# Patient Record
Sex: FEMALE | Race: WHITE | URBAN - METROPOLITAN AREA
[De-identification: names, ages, dates, MRNs, and addresses within clinical notes are randomized per-mention and may not be internally consistent; named-entity substitution may affect disease eponyms.]

---
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
## 2024-09-21 ENCOUNTER — APPOINTMENT (OUTPATIENT)
Dept: RADIOLOGY | Facility: HOSPITAL | Age: 24
End: 2024-09-21

## 2024-09-21 ENCOUNTER — HOSPITAL ENCOUNTER (EMERGENCY)
Facility: HOSPITAL | Age: 24
Discharge: HOME | End: 2024-09-22
Attending: EMERGENCY MEDICINE

## 2024-09-21 ENCOUNTER — APPOINTMENT (OUTPATIENT)
Dept: CARDIOLOGY | Facility: HOSPITAL | Age: 24
End: 2024-09-21

## 2024-09-21 DIAGNOSIS — R40.4 STARING EPISODES: Primary | ICD-10-CM

## 2024-09-21 LAB
ANION GAP BLDV CALCULATED.4IONS-SCNC: 12 MMOL/L (ref 10–25)
BASE EXCESS BLDV CALC-SCNC: 0.4 MMOL/L (ref -2–3)
BASOPHILS # BLD AUTO: 0.05 X10*3/UL (ref 0–0.1)
BASOPHILS NFR BLD AUTO: 0.4 %
BODY TEMPERATURE: 37 DEGREES CELSIUS
CA-I BLDV-SCNC: 1.24 MMOL/L (ref 1.1–1.33)
CHLORIDE BLDV-SCNC: 102 MMOL/L (ref 98–107)
EOSINOPHIL # BLD AUTO: 0.28 X10*3/UL (ref 0–0.7)
EOSINOPHIL NFR BLD AUTO: 2.1 %
ERYTHROCYTE [DISTWIDTH] IN BLOOD BY AUTOMATED COUNT: 13.1 % (ref 11.5–14.5)
GLUCOSE BLDV-MCNC: 101 MG/DL (ref 74–99)
HCO3 BLDV-SCNC: 27 MMOL/L (ref 22–26)
HCT VFR BLD AUTO: 43.5 % (ref 36–46)
HCT VFR BLD EST: 44 % (ref 36–46)
HGB BLD-MCNC: 14.2 G/DL (ref 12–16)
HGB BLDV-MCNC: 14.6 G/DL (ref 12–16)
IMM GRANULOCYTES # BLD AUTO: 0.04 X10*3/UL (ref 0–0.7)
IMM GRANULOCYTES NFR BLD AUTO: 0.3 % (ref 0–0.9)
INHALED O2 CONCENTRATION: 21 %
LACTATE BLDV-SCNC: 1.5 MMOL/L (ref 0.4–2)
LYMPHOCYTES # BLD AUTO: 3.07 X10*3/UL (ref 1.2–4.8)
LYMPHOCYTES NFR BLD AUTO: 23.1 %
MCH RBC QN AUTO: 28.7 PG (ref 26–34)
MCHC RBC AUTO-ENTMCNC: 32.6 G/DL (ref 32–36)
MCV RBC AUTO: 88 FL (ref 80–100)
MONOCYTES # BLD AUTO: 0.62 X10*3/UL (ref 0.1–1)
MONOCYTES NFR BLD AUTO: 4.7 %
NEUTROPHILS # BLD AUTO: 9.23 X10*3/UL (ref 1.2–7.7)
NEUTROPHILS NFR BLD AUTO: 69.4 %
NRBC BLD-RTO: 0 /100 WBCS (ref 0–0)
OXYHGB MFR BLDV: 55.1 % (ref 45–75)
PCO2 BLDV: 50 MM HG (ref 41–51)
PH BLDV: 7.34 PH (ref 7.33–7.43)
PLATELET # BLD AUTO: 340 X10*3/UL (ref 150–450)
PO2 BLDV: 32 MM HG (ref 35–45)
POTASSIUM BLDV-SCNC: 4.3 MMOL/L (ref 3.5–5.3)
RBC # BLD AUTO: 4.94 X10*6/UL (ref 4–5.2)
SAO2 % BLDV: 56 % (ref 45–75)
SODIUM BLDV-SCNC: 137 MMOL/L (ref 136–145)
WBC # BLD AUTO: 13.3 X10*3/UL (ref 4.4–11.3)

## 2024-09-21 PROCEDURE — 93005 ELECTROCARDIOGRAM TRACING: CPT

## 2024-09-21 PROCEDURE — 84484 ASSAY OF TROPONIN QUANT: CPT | Performed by: EMERGENCY MEDICINE

## 2024-09-21 PROCEDURE — 70450 CT HEAD/BRAIN W/O DYE: CPT

## 2024-09-21 PROCEDURE — 84132 ASSAY OF SERUM POTASSIUM: CPT | Performed by: EMERGENCY MEDICINE

## 2024-09-21 PROCEDURE — 99285 EMERGENCY DEPT VISIT HI MDM: CPT | Mod: 25

## 2024-09-21 PROCEDURE — 36415 COLL VENOUS BLD VENIPUNCTURE: CPT | Performed by: EMERGENCY MEDICINE

## 2024-09-21 PROCEDURE — 85025 COMPLETE CBC W/AUTO DIFF WBC: CPT | Performed by: EMERGENCY MEDICINE

## 2024-09-21 PROCEDURE — 83735 ASSAY OF MAGNESIUM: CPT | Performed by: EMERGENCY MEDICINE

## 2024-09-21 ASSESSMENT — PAIN SCALES - GENERAL: PAINLEVEL_OUTOF10: 9

## 2024-09-21 ASSESSMENT — LIFESTYLE VARIABLES
EVER FELT BAD OR GUILTY ABOUT YOUR DRINKING: NO
HAVE PEOPLE ANNOYED YOU BY CRITICIZING YOUR DRINKING: NO
HAVE YOU EVER FELT YOU SHOULD CUT DOWN ON YOUR DRINKING: NO
TOTAL SCORE: 0
EVER HAD A DRINK FIRST THING IN THE MORNING TO STEADY YOUR NERVES TO GET RID OF A HANGOVER: NO

## 2024-09-21 ASSESSMENT — COLUMBIA-SUICIDE SEVERITY RATING SCALE - C-SSRS
2. HAVE YOU ACTUALLY HAD ANY THOUGHTS OF KILLING YOURSELF?: NO
1. IN THE PAST MONTH, HAVE YOU WISHED YOU WERE DEAD OR WISHED YOU COULD GO TO SLEEP AND NOT WAKE UP?: NO
6. HAVE YOU EVER DONE ANYTHING, STARTED TO DO ANYTHING, OR PREPARED TO DO ANYTHING TO END YOUR LIFE?: NO

## 2024-09-21 ASSESSMENT — PAIN - FUNCTIONAL ASSESSMENT: PAIN_FUNCTIONAL_ASSESSMENT: 0-10

## 2024-09-21 ASSESSMENT — PAIN DESCRIPTION - LOCATION: LOCATION: HEAD

## 2024-09-22 VITALS
TEMPERATURE: 97.9 F | OXYGEN SATURATION: 97 % | BODY MASS INDEX: 34.53 KG/M2 | DIASTOLIC BLOOD PRESSURE: 94 MMHG | HEIGHT: 67 IN | RESPIRATION RATE: 23 BRPM | HEART RATE: 87 BPM | SYSTOLIC BLOOD PRESSURE: 159 MMHG | WEIGHT: 220 LBS

## 2024-09-22 LAB
ALBUMIN SERPL BCP-MCNC: 4.2 G/DL (ref 3.4–5)
ALP SERPL-CCNC: 76 U/L (ref 33–110)
ALT SERPL W P-5'-P-CCNC: 10 U/L (ref 7–45)
ANION GAP SERPL CALC-SCNC: 9 MMOL/L (ref 10–20)
AST SERPL W P-5'-P-CCNC: 10 U/L (ref 9–39)
BILIRUB SERPL-MCNC: 0.2 MG/DL (ref 0–1.2)
BUN SERPL-MCNC: 14 MG/DL (ref 6–23)
CALCIUM SERPL-MCNC: 9 MG/DL (ref 8.6–10.3)
CARDIAC TROPONIN I PNL SERPL HS: <3 NG/L (ref 0–13)
CHLORIDE SERPL-SCNC: 105 MMOL/L (ref 98–107)
CO2 SERPL-SCNC: 27 MMOL/L (ref 21–32)
CREAT SERPL-MCNC: 0.83 MG/DL (ref 0.5–1.05)
EGFRCR SERPLBLD CKD-EPI 2021: >90 ML/MIN/1.73M*2
GLUCOSE SERPL-MCNC: 102 MG/DL (ref 74–99)
MAGNESIUM SERPL-MCNC: 1.8 MG/DL (ref 1.6–2.4)
POTASSIUM SERPL-SCNC: 3.5 MMOL/L (ref 3.5–5.3)
PROT SERPL-MCNC: 7.2 G/DL (ref 6.4–8.2)
SODIUM SERPL-SCNC: 137 MMOL/L (ref 136–145)

## 2024-09-22 PROCEDURE — 70450 CT HEAD/BRAIN W/O DYE: CPT | Performed by: STUDENT IN AN ORGANIZED HEALTH CARE EDUCATION/TRAINING PROGRAM

## 2024-09-22 NOTE — ED PROVIDER NOTES
Grady Guy is a 24 y.o. patient presenting to the ED for seizures.  The patient states that she has had 5 seizures today.  She typically has approximately 1 seizure per month.  She does not take any medication to prevent seizures.  All of hers today have been like her typical seizures where she stares off into space.  She does not have any generalized shaking.  She does come out of it with loud noises such as clapping or yelling.  EMS states that she had 2 of these episodes during transport both of which were terminated by them clapping in her direction.  The patient states her speech became difficult after the fourth seizure today.  She currently feels very tired and drained.  She does have a headache.  This is more severe than her typical headache but otherwise similar.  Is located behind her eyes.    Additional History Obtained from: EMS  Limitations to History: None  ------------------------------------------------------------------------------------------------------------------------------------------  Physical Exam:  Appearance: Alert, cooperative.  Skin: Warm, dry, appropriate color for ethnicity.  Eyes: Cornea clear. No scleral icterus or injection.   ENT: Mucous membranes moist.  Pulmonary: No accessory muscle use or stridor. Clear lung sounds bilaterally without rhonchi or wheezing.   Cardiac: Heart sounds regular without murmur. B/L radial pulses full and symmetric.   Abdomen: Soft, not tender.  No rebound or guarding.   Musculoskeletal: No gross deformities.   Neurological: Face symmetrical. Voice clear but slow with some stuttering. Appropriately conversant.  Motor and sensation intact x 4 extremities.  Psychiatric: Appropriate mood and affect.    Medical Decision Making:  Chronic Medical Conditions Significantly Affecting Care:  has a past medical history of Acute maxillary sinusitis, unspecified (03/07/2017), Ingrowing nail (11/14/2016), Intracranial hypotension, unspecified (09/17/2020), Other  conditions influencing health status (07/17/2013), Other conditions influencing health status, Other specified disorders of the skin and subcutaneous tissue, Personal history of other diseases of the respiratory system (11/16/2016), and Sensorineural hearing loss, bilateral (08/31/2017).    Social Determinants of Health Significantly Affecting Care: None identified    Differential Diagnosis Considered but not limited to: Patient with a history of Crouzon syndrome and seizures.  Multiple episodes of staring into space are described.  It is not clear if these are epileptic events as they are terminated by loud noises and while the patient feels confused afterwards there is no other described postictal period, and despite having these her doctors have not recommended medication to treat them.  Electrolyte disturbance, syncope are additional considerations.      External Records Reviewed:   I reviewed recent and relevant outside records including:     Independent Interpretation of Studies: The following studies were ordered as part of the emergency department work up and independently interpreted by me. See ED Course for details.    CBC, CMP are generally unremarkable.  The patient does have minimally elevated white blood cell count at 13.  No bandemia though.  Troponin is normal.    CT head is without acute abnormality.    ED Course as of 09/23/24 1452   Sat Sep 21, 2024   2316 EKG performed at 2306 and interpreted by me shows sinus rhythm at a rate of 93.  Intervals are normal.  The axis is normal.  There are no significant ST or T wave changes.  No STEMI. [SP]   2359 Venous full panel with normal pH, pCO2, lactate. [SP]      ED Course User Index  [SP] Jessica Gillette DO         Diagnoses as of 09/23/24 1452   Staring episodes       I discussed the results with the patient, her sister, and her fiancé at bedside.  The patient does not have current neurology care established.  They are planning to see her sisters neurologist  soon.  We did discuss admission versus outpatient workup.       Jessica Gillette,   09/23/24 1299

## 2024-09-22 NOTE — ED TRIAGE NOTES
Pt BIBA c/c of seizure. Per ems pt had 5 seizures at home and 2 seizures en route. Ems states that she appears to be having focal seizures she will stare out and states if you clap she comes to. Pt had one in ED bed at this time and tech clapped and pt came out of it. VSS resp even and unlabored. Pt complaining of chest tightness and headache. Pt also states she is not talking how she normally does. Speech is slow.     Pt A&Ox3, pt alert calm cooperative with care. Pt resp even and unlabored.     PMH: seizures

## 2024-09-24 LAB
ATRIAL RATE: 97 BPM
P AXIS: 57 DEGREES
PR INTERVAL: 127 MS
Q ONSET: 254 MS
QRS COUNT: 15 BEATS
QRS DURATION: 76 MS
QT INTERVAL: 354 MS
QTC CALCULATION(BAZETT): 441 MS
QTC FREDERICIA: 410 MS
R AXIS: 69 DEGREES
T AXIS: 21 DEGREES
T OFFSET: 431 MS
VENTRICULAR RATE: 93 BPM

## 2024-10-02 ENCOUNTER — TELEPHONE (OUTPATIENT)
Dept: PLASTIC SURGERY | Facility: HOSPITAL | Age: 24
End: 2024-10-02

## 2024-10-02 NOTE — TELEPHONE ENCOUNTER
"Called Grady to f/u on [facility]/insurance coverage. Grady stated she had not yet received a call to schedule her [facility] visit. Giuseppe advised her to keep an eye out for a call and to continue reaching out to insurance to try and activate it as she states when she calls her insurance, it goes to a dial tone.     Giuseppe offered to email over contact information for her insurance company ([employer]) and the [facility] medical application form as hers had  in . Grady gave [email or web address] as a good email address. Alainasp sent the following email:  \"Hello,    Attached is the medical application form for [facility]. Please print and physically sign the application and verify that all the information is correct. Scan and send it back to me once completed.    Additionally, here is the contact information for [facility] Medicaid: [phone]. Additionally, consider visiting your local [facility] location and asking for assistance with your Medicaid plan: [email or web address]     Please let me know if you have any questions.  Thanks,  Cj\"    Giuseppe to reach out to the [facility] Clinic Coordinator to have her scheduled as per Grady, she is \"having problems with her Crouzons\" and she would like to be seen.    Social Work will continue to remain available for any questions or concerns. Please feel free to reach out. It was a pleasure getting to know you and your family today.    10/2/2024   Cj Neri STEPH  [facility]   Office Phone: [phone]  Pager [phone]     "

## 2024-10-09 ENCOUNTER — TELEPHONE (OUTPATIENT)
Dept: PLASTIC SURGERY | Facility: HOSPITAL | Age: 24
End: 2024-10-09

## 2024-10-14 ENCOUNTER — TELEPHONE (OUTPATIENT)
Dept: PLASTIC SURGERY | Facility: HOSPITAL | Age: 24
End: 2024-10-14
Payer: MEDICAID

## 2024-10-14 NOTE — TELEPHONE ENCOUNTER
"M re: received message that [facility] attachment was \"unreadable\" and pt could not print it. Resent attachment in both PDF and PNG form.     Social Work will continue to remain available for any questions or concerns. Please feel free to reach out. It was a pleasure getting to know you and your family today.    Reviewed and approved by RUPERT MCKEON on 10/14/24 at 8:57 AM.   ANAMIKA Arora  [facility]io[facility] Clinic   Office Phone: [phone]  Pager [phone]   "

## 2024-10-14 NOTE — TELEPHONE ENCOUNTER
"Received call from pt re: [facility] application. Asked to have it mailed versus printing it herself d/t computer troubles. Also asked for phone number for neurology d/t seizures. Gave ER precautions. Emailed information.     Email stated:  \"Grady Mansfield!    Here are the numbers for Neurology:  Pediatric Neurology - [phone]  Adult Neurology - [phone]    Please go to the Emergency Department if you feel like you need to. Do not wait until an appointment becomes available. Your health is important.    ThanksRupert\"    Social Work will continue to remain available for any questions or concerns. Please feel free to reach out. It was a pleasure getting to know you and your family today.    Reviewed and approved by RUPERT MCKEON on 10/14/24 at 2:29 PM.   ANAMIKA Arora  [facility]io[facility]   Office Phone: [phone]  Pager [phone]   "

## 2024-11-26 ENCOUNTER — APPOINTMENT (OUTPATIENT)
Dept: NEUROLOGY | Facility: CLINIC | Age: 24
End: 2024-11-26
Payer: MEDICAID

## 2025-02-18 ENCOUNTER — OFFICE VISIT (OUTPATIENT)
Dept: NEUROLOGY | Facility: CLINIC | Age: 25
End: 2025-02-18
Payer: MEDICAID

## 2025-02-18 VITALS
DIASTOLIC BLOOD PRESSURE: 74 MMHG | HEART RATE: 91 BPM | RESPIRATION RATE: 19 BRPM | SYSTOLIC BLOOD PRESSURE: 109 MMHG | WEIGHT: 199 LBS | BODY MASS INDEX: 31.17 KG/M2

## 2025-02-18 DIAGNOSIS — G40.909 SEIZURE DISORDER (MULTI): ICD-10-CM

## 2025-02-18 DIAGNOSIS — R56.9 SEIZURE-LIKE ACTIVITY (MULTI): Primary | ICD-10-CM

## 2025-02-18 PROCEDURE — 99204 OFFICE O/P NEW MOD 45 MIN: CPT | Performed by: STUDENT IN AN ORGANIZED HEALTH CARE EDUCATION/TRAINING PROGRAM

## 2025-02-18 PROCEDURE — 99214 OFFICE O/P EST MOD 30 MIN: CPT | Mod: GC | Performed by: STUDENT IN AN ORGANIZED HEALTH CARE EDUCATION/TRAINING PROGRAM

## 2025-02-18 RX ORDER — BUDESONIDE AND FORMOTEROL FUMARATE DIHYDRATE 160; 4.5 UG/1; UG/1
2 AEROSOL RESPIRATORY (INHALATION) 2 TIMES DAILY
COMMUNITY

## 2025-02-18 RX ORDER — ALBUTEROL SULFATE 0.83 MG/ML
2.5 SOLUTION RESPIRATORY (INHALATION) EVERY 6 HOURS PRN
COMMUNITY
Start: 2024-11-06

## 2025-02-18 ASSESSMENT — PAIN SCALES - GENERAL: PAINLEVEL_OUTOF10: 0-NO PAIN
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
## 2025-02-18 NOTE — PATIENT INSTRUCTIONS
Grady Guy,    It was a pleasure seeing you in clinic today - you saw Dr. Juarez and Dr. Sagastume for your seizures. Based on our discussion, we suspect you may be having epileptic seizures. However, since these can mimic non-epileptic etiologies - we recommend monitoring you in an adult epilepsy monitoring unit. We monitor you for a few days with brain wave testing (EEG) and we will be monitoring you for any seizures with maneuvers to trigger a seizure.    We also recommend capturing a video of these episodes to better understand what they look like.     Since we don't have enough evidence of epilepsy yet - we agreed to wait until we have additional testing to better treat you appropriately.     Please follow up in clinic with Dr. Juarez after your AMU stay. Our coordinators will call you to schedule an AMU stay.     It was a pleasure seeing you in clinic,    Neurology Care Team

## 2025-02-19 PROBLEM — R56.9 SEIZURE-LIKE ACTIVITY (MULTI): Status: ACTIVE | Noted: 2025-02-18

## 2025-02-19 PROBLEM — R56.9 SEIZURE-LIKE ACTIVITY (MULTI): Status: ACTIVE | Noted: 2025-02-19

## 2025-05-06 ENCOUNTER — TELEPHONE (OUTPATIENT)
Dept: NEUROLOGY | Facility: CLINIC | Age: 25
End: 2025-05-06
Payer: MEDICAID

## 2025-05-06 DIAGNOSIS — R56.9 SEIZURE-LIKE ACTIVITY (MULTI): Primary | ICD-10-CM

## 2025-05-06 RX ORDER — OXCARBAZEPINE 150 MG/1
TABLET, FILM COATED ORAL
Qty: 134 TABLET | Refills: 0 | Status: SHIPPED | OUTPATIENT
Start: 2025-05-06 | End: 2025-06-12

## 2025-05-06 NOTE — TELEPHONE ENCOUNTER
Called patient after she reached out to clinic to let us know of recent seizure-like event.    She was with her fiance and family in the car when suddenly she experienced chest pain then everything went black.     She was told she had two back to back seizures, the first was 2 minute then and 12-14 minute. Her brother told her she had violent shaking and foaming at the mouth. No tongue bite. He also said she started crying during the seizure.    She woke up feeling disoriented, confused, and her whole body felt in pain.    I discussed my suspicion is that these are still likely non-epileptic spells, but given her and her family's concern of the worsening of these events she wanted to pursue medical treatment while pending EMU admission later this month. We will start -150 for 1 week followed by titration to 300-300.     4D Classification  Paroxysmal event  Semiology: Aura -> Bilateral motor event (D)  Diagnostic signs: Ictal Eye closure, ictal crying  Etiology: unknown, suspicious for non-epileptic spells  Co-morbidities: Crouzon syndrome, anxiety, bipolar disorder    If events are confirmed to be non-epileptic, OXC can be discontinued during EMU admission.

## 2025-05-26 ENCOUNTER — HOSPITAL ENCOUNTER (OUTPATIENT)
Dept: NEUROLOGY | Facility: HOSPITAL | Age: 25
Discharge: HOME | End: 2025-05-27
Attending: STUDENT IN AN ORGANIZED HEALTH CARE EDUCATION/TRAINING PROGRAM | Admitting: STUDENT IN AN ORGANIZED HEALTH CARE EDUCATION/TRAINING PROGRAM
Payer: MEDICAID

## 2025-05-26 VITALS
HEIGHT: 67 IN | DIASTOLIC BLOOD PRESSURE: 66 MMHG | BODY MASS INDEX: 31.08 KG/M2 | WEIGHT: 198 LBS | TEMPERATURE: 97.7 F | RESPIRATION RATE: 12 BRPM | HEART RATE: 80 BPM | OXYGEN SATURATION: 98 % | SYSTOLIC BLOOD PRESSURE: 110 MMHG

## 2025-05-26 DIAGNOSIS — F44.5 PSYCHOGENIC NONEPILEPTIC SEIZURE: Primary | ICD-10-CM

## 2025-05-26 DIAGNOSIS — G40.909 SEIZURE DISORDER (MULTI): ICD-10-CM

## 2025-05-26 LAB
ALBUMIN SERPL BCP-MCNC: 3.9 G/DL (ref 3.4–5)
ALP SERPL-CCNC: 76 U/L (ref 33–110)
ALT SERPL W P-5'-P-CCNC: 14 U/L (ref 7–45)
ANION GAP SERPL CALC-SCNC: 11 MMOL/L (ref 10–20)
AST SERPL W P-5'-P-CCNC: 11 U/L (ref 9–39)
B-HCG SERPL-ACNC: <3 MIU/ML
BASOPHILS # BLD AUTO: 0.11 X10*3/UL (ref 0–0.1)
BASOPHILS NFR BLD AUTO: 1.7 %
BILIRUB SERPL-MCNC: 0.2 MG/DL (ref 0–1.2)
BUN SERPL-MCNC: 14 MG/DL (ref 6–23)
CALCIUM SERPL-MCNC: 8.9 MG/DL (ref 8.6–10.6)
CHLORIDE SERPL-SCNC: 106 MMOL/L (ref 98–107)
CO2 SERPL-SCNC: 25 MMOL/L (ref 21–32)
CREAT SERPL-MCNC: 0.77 MG/DL (ref 0.5–1.05)
EGFRCR SERPLBLD CKD-EPI 2021: >90 ML/MIN/1.73M*2
EOSINOPHIL # BLD AUTO: 0.26 X10*3/UL (ref 0–0.7)
EOSINOPHIL NFR BLD AUTO: 4.1 %
ERYTHROCYTE [DISTWIDTH] IN BLOOD BY AUTOMATED COUNT: 12.6 % (ref 11.5–14.5)
GLUCOSE SERPL-MCNC: 88 MG/DL (ref 74–99)
HCT VFR BLD AUTO: 41.1 % (ref 36–46)
HGB BLD-MCNC: 13.4 G/DL (ref 12–16)
IMM GRANULOCYTES # BLD AUTO: 0.01 X10*3/UL (ref 0–0.7)
IMM GRANULOCYTES NFR BLD AUTO: 0.2 % (ref 0–0.9)
LYMPHOCYTES # BLD AUTO: 2.04 X10*3/UL (ref 1.2–4.8)
LYMPHOCYTES NFR BLD AUTO: 31.8 %
MCH RBC QN AUTO: 27.9 PG (ref 26–34)
MCHC RBC AUTO-ENTMCNC: 32.6 G/DL (ref 32–36)
MCV RBC AUTO: 86 FL (ref 80–100)
MONOCYTES # BLD AUTO: 0.52 X10*3/UL (ref 0.1–1)
MONOCYTES NFR BLD AUTO: 8.1 %
NEUTROPHILS # BLD AUTO: 3.47 X10*3/UL (ref 1.2–7.7)
NEUTROPHILS NFR BLD AUTO: 54.1 %
NRBC BLD-RTO: 0 /100 WBCS (ref 0–0)
PLATELET # BLD AUTO: 279 X10*3/UL (ref 150–450)
POTASSIUM SERPL-SCNC: 3.7 MMOL/L (ref 3.5–5.3)
PROT SERPL-MCNC: 6.7 G/DL (ref 6.4–8.2)
RBC # BLD AUTO: 4.8 X10*6/UL (ref 4–5.2)
SODIUM SERPL-SCNC: 138 MMOL/L (ref 136–145)
WBC # BLD AUTO: 6.4 X10*3/UL (ref 4.4–11.3)

## 2025-05-26 PROCEDURE — 93005 ELECTROCARDIOGRAM TRACING: CPT

## 2025-05-26 PROCEDURE — 1100000003 HC PRIVATE ROOM DAILY - LAKESIDE

## 2025-05-26 PROCEDURE — 99222 1ST HOSP IP/OBS MODERATE 55: CPT

## 2025-05-26 PROCEDURE — 2500000001 HC RX 250 WO HCPCS SELF ADMINISTERED DRUGS (ALT 637 FOR MEDICARE OP): Mod: SE | Performed by: STUDENT IN AN ORGANIZED HEALTH CARE EDUCATION/TRAINING PROGRAM

## 2025-05-26 PROCEDURE — 84702 CHORIONIC GONADOTROPIN TEST: CPT | Performed by: STUDENT IN AN ORGANIZED HEALTH CARE EDUCATION/TRAINING PROGRAM

## 2025-05-26 PROCEDURE — 36415 COLL VENOUS BLD VENIPUNCTURE: CPT

## 2025-05-26 PROCEDURE — 36415 COLL VENOUS BLD VENIPUNCTURE: CPT | Performed by: STUDENT IN AN ORGANIZED HEALTH CARE EDUCATION/TRAINING PROGRAM

## 2025-05-26 PROCEDURE — 2500000004 HC RX 250 GENERAL PHARMACY W/ HCPCS (ALT 636 FOR OP/ED): Mod: SE,JZ | Performed by: STUDENT IN AN ORGANIZED HEALTH CARE EDUCATION/TRAINING PROGRAM

## 2025-05-26 PROCEDURE — 80053 COMPREHEN METABOLIC PANEL: CPT

## 2025-05-26 PROCEDURE — 85025 COMPLETE CBC W/AUTO DIFF WBC: CPT

## 2025-05-26 RX ORDER — ALBUTEROL SULFATE 0.83 MG/ML
2.5 SOLUTION RESPIRATORY (INHALATION) EVERY 6 HOURS PRN
Status: DISCONTINUED | OUTPATIENT
Start: 2025-05-26 | End: 2025-05-27 | Stop reason: HOSPADM

## 2025-05-26 RX ORDER — ACETAMINOPHEN 650 MG/1
650 SUPPOSITORY RECTAL EVERY 4 HOURS PRN
Status: CANCELLED | OUTPATIENT
Start: 2025-05-26

## 2025-05-26 RX ORDER — IBUPROFEN 400 MG/1
400 TABLET, FILM COATED ORAL EVERY 8 HOURS PRN
Status: CANCELLED | OUTPATIENT
Start: 2025-05-26

## 2025-05-26 RX ORDER — FLUTICASONE FUROATE AND VILANTEROL 200; 25 UG/1; UG/1
1 POWDER RESPIRATORY (INHALATION)
Status: DISCONTINUED | OUTPATIENT
Start: 2025-05-27 | End: 2025-05-27 | Stop reason: HOSPADM

## 2025-05-26 RX ORDER — ACETAMINOPHEN 650 MG/1
650 SUPPOSITORY RECTAL EVERY 4 HOURS PRN
Status: DISCONTINUED | OUTPATIENT
Start: 2025-05-26 | End: 2025-05-27 | Stop reason: HOSPADM

## 2025-05-26 RX ORDER — ACETAMINOPHEN 160 MG/5ML
650 SOLUTION ORAL EVERY 4 HOURS PRN
Status: DISCONTINUED | OUTPATIENT
Start: 2025-05-26 | End: 2025-05-27 | Stop reason: HOSPADM

## 2025-05-26 RX ORDER — IBUPROFEN 600 MG/1
600 TABLET, FILM COATED ORAL EVERY 8 HOURS PRN
Status: DISCONTINUED | OUTPATIENT
Start: 2025-05-26 | End: 2025-05-27 | Stop reason: HOSPADM

## 2025-05-26 RX ORDER — ACETAMINOPHEN 325 MG/1
650 TABLET ORAL EVERY 4 HOURS PRN
Status: CANCELLED | OUTPATIENT
Start: 2025-05-26

## 2025-05-26 RX ORDER — DIPHENHYDRAMINE HCL 25 MG
25 CAPSULE ORAL EVERY 6 HOURS PRN
Status: DISCONTINUED | OUTPATIENT
Start: 2025-05-26 | End: 2025-05-27 | Stop reason: HOSPADM

## 2025-05-26 RX ORDER — LORAZEPAM 2 MG/ML
2 INJECTION INTRAMUSCULAR EVERY 5 MIN PRN
Status: DISCONTINUED | OUTPATIENT
Start: 2025-05-26 | End: 2025-05-27 | Stop reason: HOSPADM

## 2025-05-26 RX ORDER — ACETAMINOPHEN 160 MG/5ML
650 SOLUTION ORAL EVERY 4 HOURS PRN
Status: CANCELLED | OUTPATIENT
Start: 2025-05-26

## 2025-05-26 RX ORDER — ACETAMINOPHEN 325 MG/1
650 TABLET ORAL EVERY 4 HOURS PRN
Status: DISCONTINUED | OUTPATIENT
Start: 2025-05-26 | End: 2025-05-27 | Stop reason: HOSPADM

## 2025-05-26 RX ORDER — DIPHENHYDRAMINE HCL 25 MG
25 CAPSULE ORAL EVERY 6 HOURS PRN
Status: CANCELLED | OUTPATIENT
Start: 2025-05-26

## 2025-05-26 RX ADMIN — ACETAMINOPHEN 650 MG: 325 TABLET, FILM COATED ORAL at 19:44

## 2025-05-26 RX ADMIN — IBUPROFEN 600 MG: 600 TABLET, FILM COATED ORAL at 22:43

## 2025-05-26 RX ADMIN — SODIUM CHLORIDE, SODIUM LACTATE, POTASSIUM CHLORIDE, AND CALCIUM CHLORIDE 250 ML: .6; .31; .03; .02 INJECTION, SOLUTION INTRAVENOUS at 20:48

## 2025-05-26 SDOH — HEALTH STABILITY: MENTAL HEALTH: HOW MANY DRINKS CONTAINING ALCOHOL DO YOU HAVE ON A TYPICAL DAY WHEN YOU ARE DRINKING?: PATIENT DOES NOT DRINK

## 2025-05-26 SDOH — SOCIAL STABILITY: SOCIAL INSECURITY
WITHIN THE LAST YEAR, HAVE YOU BEEN KICKED, HIT, SLAPPED, OR OTHERWISE PHYSICALLY HURT BY YOUR PARTNER OR EX-PARTNER?: NO

## 2025-05-26 SDOH — ECONOMIC STABILITY: FOOD INSECURITY: HOW HARD IS IT FOR YOU TO PAY FOR THE VERY BASICS LIKE FOOD, HOUSING, MEDICAL CARE, AND HEATING?: SOMEWHAT HARD

## 2025-05-26 SDOH — SOCIAL STABILITY: SOCIAL INSECURITY: DO YOU FEEL ANYONE HAS EXPLOITED OR TAKEN ADVANTAGE OF YOU FINANCIALLY OR OF YOUR PERSONAL PROPERTY?: NO

## 2025-05-26 SDOH — SOCIAL STABILITY: SOCIAL INSECURITY: WITHIN THE LAST YEAR, HAVE YOU BEEN HUMILIATED OR EMOTIONALLY ABUSED IN OTHER WAYS BY YOUR PARTNER OR EX-PARTNER?: NO

## 2025-05-26 SDOH — SOCIAL STABILITY: SOCIAL INSECURITY: ARE THERE ANY APPARENT SIGNS OF INJURIES/BEHAVIORS THAT COULD BE RELATED TO ABUSE/NEGLECT?: NO

## 2025-05-26 SDOH — ECONOMIC STABILITY: FOOD INSECURITY: WITHIN THE PAST 12 MONTHS, YOU WORRIED THAT YOUR FOOD WOULD RUN OUT BEFORE YOU GOT THE MONEY TO BUY MORE.: NEVER TRUE

## 2025-05-26 SDOH — SOCIAL STABILITY: SOCIAL INSECURITY
WITHIN THE LAST YEAR, HAVE YOU BEEN RAPED OR FORCED TO HAVE ANY KIND OF SEXUAL ACTIVITY BY YOUR PARTNER OR EX-PARTNER?: NO

## 2025-05-26 SDOH — HEALTH STABILITY: MENTAL HEALTH: HOW OFTEN DO YOU HAVE SIX OR MORE DRINKS ON ONE OCCASION?: NEVER

## 2025-05-26 SDOH — ECONOMIC STABILITY: HOUSING INSECURITY: AT ANY TIME IN THE PAST 12 MONTHS, WERE YOU HOMELESS OR LIVING IN A SHELTER (INCLUDING NOW)?: NO

## 2025-05-26 SDOH — ECONOMIC STABILITY: FOOD INSECURITY: WITHIN THE PAST 12 MONTHS, THE FOOD YOU BOUGHT JUST DIDN'T LAST AND YOU DIDN'T HAVE MONEY TO GET MORE.: NEVER TRUE

## 2025-05-26 SDOH — SOCIAL STABILITY: SOCIAL INSECURITY: WITHIN THE LAST YEAR, HAVE YOU BEEN AFRAID OF YOUR PARTNER OR EX-PARTNER?: NO

## 2025-05-26 SDOH — ECONOMIC STABILITY: HOUSING INSECURITY: IN THE LAST 12 MONTHS, WAS THERE A TIME WHEN YOU WERE NOT ABLE TO PAY THE MORTGAGE OR RENT ON TIME?: NO

## 2025-05-26 SDOH — ECONOMIC STABILITY: INCOME INSECURITY: IN THE PAST 12 MONTHS HAS THE ELECTRIC, GAS, OIL, OR WATER COMPANY THREATENED TO SHUT OFF SERVICES IN YOUR HOME?: NO

## 2025-05-26 SDOH — ECONOMIC STABILITY: HOUSING INSECURITY: IN THE PAST 12 MONTHS, HOW MANY TIMES HAVE YOU MOVED WHERE YOU WERE LIVING?: 0

## 2025-05-26 SDOH — HEALTH STABILITY: MENTAL HEALTH: HOW OFTEN DO YOU HAVE A DRINK CONTAINING ALCOHOL?: NEVER

## 2025-05-26 SDOH — SOCIAL STABILITY: SOCIAL INSECURITY: DO YOU FEEL UNSAFE GOING BACK TO THE PLACE WHERE YOU ARE LIVING?: NO

## 2025-05-26 SDOH — SOCIAL STABILITY: SOCIAL INSECURITY: HAS ANYONE EVER THREATENED TO HURT YOUR FAMILY OR YOUR PETS?: NO

## 2025-05-26 SDOH — SOCIAL STABILITY: SOCIAL INSECURITY: HAVE YOU HAD ANY THOUGHTS OF HARMING ANYONE ELSE?: NO

## 2025-05-26 SDOH — SOCIAL STABILITY: SOCIAL INSECURITY: WERE YOU ABLE TO COMPLETE ALL THE BEHAVIORAL HEALTH SCREENINGS?: YES

## 2025-05-26 SDOH — SOCIAL STABILITY: SOCIAL INSECURITY: HAVE YOU HAD THOUGHTS OF HARMING ANYONE ELSE?: NO

## 2025-05-26 SDOH — SOCIAL STABILITY: SOCIAL INSECURITY: ARE YOU OR HAVE YOU BEEN THREATENED OR ABUSED PHYSICALLY, EMOTIONALLY, OR SEXUALLY BY ANYONE?: NO

## 2025-05-26 SDOH — ECONOMIC STABILITY: TRANSPORTATION INSECURITY: IN THE PAST 12 MONTHS, HAS LACK OF TRANSPORTATION KEPT YOU FROM MEDICAL APPOINTMENTS OR FROM GETTING MEDICATIONS?: NO

## 2025-05-26 SDOH — SOCIAL STABILITY: SOCIAL INSECURITY: ABUSE: ADULT

## 2025-05-26 SDOH — SOCIAL STABILITY: SOCIAL INSECURITY: DOES ANYONE TRY TO KEEP YOU FROM HAVING/CONTACTING OTHER FRIENDS OR DOING THINGS OUTSIDE YOUR HOME?: NO

## 2025-05-26 ASSESSMENT — COGNITIVE AND FUNCTIONAL STATUS - GENERAL
DAILY ACTIVITIY SCORE: 24
MOBILITY SCORE: 24
PATIENT BASELINE BEDBOUND: NO
DAILY ACTIVITIY SCORE: 24
MOBILITY SCORE: 24
DAILY ACTIVITIY SCORE: 24
MOBILITY SCORE: 24

## 2025-05-26 ASSESSMENT — ACTIVITIES OF DAILY LIVING (ADL)
PATIENT'S MEMORY ADEQUATE TO SAFELY COMPLETE DAILY ACTIVITIES?: YES
HEARING - RIGHT EAR: FUNCTIONAL
LACK_OF_TRANSPORTATION: NO
WALKS IN HOME: INDEPENDENT
BATHING: INDEPENDENT
JUDGMENT_ADEQUATE_SAFELY_COMPLETE_DAILY_ACTIVITIES: YES
LACK_OF_TRANSPORTATION: NO
DRESSING YOURSELF: INDEPENDENT
GROOMING: INDEPENDENT
ADEQUATE_TO_COMPLETE_ADL: YES
HEARING - LEFT EAR: FUNCTIONAL
FEEDING YOURSELF: INDEPENDENT
TOILETING: INDEPENDENT

## 2025-05-26 ASSESSMENT — PAIN - FUNCTIONAL ASSESSMENT
PAIN_FUNCTIONAL_ASSESSMENT: 0-10

## 2025-05-26 ASSESSMENT — PAIN SCALES - GENERAL
PAINLEVEL_OUTOF10: 0 - NO PAIN
PAINLEVEL_OUTOF10: 10 - WORST POSSIBLE PAIN
PAINLEVEL_OUTOF10: 6
PAINLEVEL_OUTOF10: 9
PAINLEVEL_OUTOF10: 0 - NO PAIN
PAINLEVEL_OUTOF10: 6

## 2025-05-26 ASSESSMENT — COLUMBIA-SUICIDE SEVERITY RATING SCALE - C-SSRS
1. IN THE PAST MONTH, HAVE YOU WISHED YOU WERE DEAD OR WISHED YOU COULD GO TO SLEEP AND NOT WAKE UP?: NO
2. HAVE YOU ACTUALLY HAD ANY THOUGHTS OF KILLING YOURSELF?: NO
6. HAVE YOU EVER DONE ANYTHING, STARTED TO DO ANYTHING, OR PREPARED TO DO ANYTHING TO END YOUR LIFE?: NO

## 2025-05-26 ASSESSMENT — PAIN DESCRIPTION - DESCRIPTORS
DESCRIPTORS: ACHING

## 2025-05-26 ASSESSMENT — LIFESTYLE VARIABLES
AUDIT-C TOTAL SCORE: 0
SKIP TO QUESTIONS 9-10: 1

## 2025-05-26 ASSESSMENT — PATIENT HEALTH QUESTIONNAIRE - PHQ9
2. FEELING DOWN, DEPRESSED OR HOPELESS: NOT AT ALL
1. LITTLE INTEREST OR PLEASURE IN DOING THINGS: NOT AT ALL
SUM OF ALL RESPONSES TO PHQ9 QUESTIONS 1 & 2: 0

## 2025-05-26 ASSESSMENT — PAIN DESCRIPTION - LOCATION: LOCATION: HEAD
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
## 2025-05-26 NOTE — CARE PLAN
The patient's goals for the shift include      The clinical goals for the shift include Pt will tolerate vEEG to have event captured

## 2025-05-27 ENCOUNTER — HOSPITAL ENCOUNTER (OUTPATIENT)
Dept: NEUROLOGY | Facility: HOSPITAL | Age: 25
Discharge: HOME | End: 2025-05-27
Payer: MEDICAID

## 2025-05-27 ENCOUNTER — APPOINTMENT (OUTPATIENT)
Dept: CARDIOLOGY | Facility: HOSPITAL | Age: 25
End: 2025-05-27
Payer: MEDICAID

## 2025-05-27 PROCEDURE — 95700 EEG CONT REC W/VID EEG TECH: CPT

## 2025-05-27 PROCEDURE — 99239 HOSP IP/OBS DSCHRG MGMT >30: CPT

## 2025-05-27 PROCEDURE — 95720 EEG PHY/QHP EA INCR W/VEEG: CPT | Performed by: STUDENT IN AN ORGANIZED HEALTH CARE EDUCATION/TRAINING PROGRAM

## 2025-05-27 PROCEDURE — 2500000001 HC RX 250 WO HCPCS SELF ADMINISTERED DRUGS (ALT 637 FOR MEDICARE OP): Mod: SE | Performed by: STUDENT IN AN ORGANIZED HEALTH CARE EDUCATION/TRAINING PROGRAM

## 2025-05-27 PROCEDURE — 95716 VEEG EA 12-26HR CONT MNTR: CPT

## 2025-05-27 RX ORDER — IBUPROFEN 200 MG
200 TABLET ORAL EVERY 6 HOURS PRN
COMMUNITY

## 2025-05-27 RX ORDER — ALBUTEROL SULFATE 90 UG/1
2 INHALANT RESPIRATORY (INHALATION) EVERY 6 HOURS PRN
COMMUNITY

## 2025-05-27 RX ADMIN — FLUTICASONE FUROATE AND VILANTEROL TRIFENATATE 1 PUFF: 200; 25 POWDER RESPIRATORY (INHALATION) at 08:10

## 2025-05-27 ASSESSMENT — COGNITIVE AND FUNCTIONAL STATUS - GENERAL
MOBILITY SCORE: 24
DAILY ACTIVITIY SCORE: 24

## 2025-05-27 ASSESSMENT — ACTIVITIES OF DAILY LIVING (ADL): LACK_OF_TRANSPORTATION: NO

## 2025-05-27 ASSESSMENT — PAIN SCALES - GENERAL
PAINLEVEL_OUTOF10: 0 - NO PAIN
PAINLEVEL_OUTOF10: 0 - NO PAIN

## 2025-05-27 ASSESSMENT — PAIN - FUNCTIONAL ASSESSMENT: PAIN_FUNCTIONAL_ASSESSMENT: 0-10

## 2025-05-27 ASSESSMENT — VISUAL ACUITY: VA_NORMAL: 1

## 2025-05-27 NOTE — CARE PLAN
The clinical goals for the shift include Pt will tolerate VEEG monitoring to capture events for clinical correlation and medical management.    Pt with 1 event this evening during baseline testing. MD correlation pending.

## 2025-05-27 NOTE — PROGRESS NOTES
Pharmacy Medication History Review    Grady Guy is a 25 y.o. female admitted for Seizure disorder (Multi). Pharmacy reviewed the patient's [id number] medications and allergies for accuracy.    Medications ADDED:  Albuterol HFA  Ibuprofen OTC  Medications CHANGED:  None  Medications REMOVED:   Ibuprofen split tab     The list below reflects the updated PTA list.   Prior to Admission Medications   Prescriptions Last Dose Informant   OXcarbazepine (Trileptal) 150 mg tablet  Self   Sig: Take 1 tablet (150 mg) by mouth 2 times a day for 7 days, THEN 2 tablets (300 mg) 2 times a day.    --> was taking 2 tablets twice daily prior to admission   Symbicort 160-4.5 mcg/actuation inhaler  Self   Sig: Inhale 2 puffs 2 times a day.   albuterol 2.5 mg /3 mL (0.083 %) nebulizer solution  Self   Sig: Inhale 3 mL (2.5 mg) every 6 hours if needed for wheezing or shortness of breath.   albuterol 90 mcg/actuation inhaler  Self   Sig: Inhale 2 puffs every 6 hours if needed for wheezing.   ibuprofen 200 mg tablet  Self   Sig: Take 1 tablet (200 mg) by mouth every 6 hours if needed for mild pain (1 - 3).      Facility-Administered Medications: None        The list below reflects the updated allergy list. Please review each documented allergy for additional clarification and justification.  Allergies  Reviewed by Radha Isaac, PharmD on 5/27/2025        Severity Reactions Comments    Fluoxetine High Anaphylaxis, Hives, Rash, Unknown prozac    Penicillins High Anaphylaxis, Hives, Unknown amoxicillin    Sertraline High Anaphylaxis, Hives, Rash, Unknown zoloft            Patient accepts M2B at discharge.     Sources:   [facility]  Pharmacy dispense history  Patient Interview-- Moderate historian  Pt had some medication vials with her at bedside  Family available at bedside to help support medication history  Chart Review  Neurology note from 2/18  Pulmonary note from 5/19  Care Everywhere    Additional  "Comments:  None      Radha Isaac, PharmD  Transitions of Care Pharmacist  05/27/25     Secure Chat preferred   If no response call [phone] or Vocera \"Med Rec\"      "

## 2025-05-27 NOTE — HOSPITAL COURSE
"Grady Guy is a 24 y.o. right-handed female w/ PMHx of Crouzon syndrome, bipolar disorder, autism, ADHD who presents to EMU for characterization of paroxysmal events. She was previously following with Dr. Juarez ([facility]) then referred to epilepsy clinic with  who referred to EMU for further evaluation of her paroxysmal spells. On 5/6/25, reported on telephone encounter that she had 2 back to back events described as violent shaking and foaming at the mouth without tongue biting and crying after the episode and was started on OXC 300mg BID in interm.     During this 24 hour EMU stay - she was slowly taken off her OXC. She had one of her typical spells that was captured with NO EEG correlate. This was described as unresponsiveness with bilateral hand \"flapping-like\" movements and pelvic/whole body thrusting. This lasted about 3-4 minutes and she was crying in interim for a long period of time. Per fiance in the room, this was consistent with her \"bigger\" spells. 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 We discussed this with patient and her fiance in detail. We also discussed psychology and psychiatry referrals for further management of PNES in conjunction with neurology. She was discharged home in a stable condition.     ASMs on discharge: NONE. Oxcarbazepine DISCONTINUED given strong suspicion for PNES.     4D-Classification:  Event type: PSYCHOGENIC non-epileptic spells    Seizure Semiology: Dyscognitive --> Bilateral Motor event   Onset: Age 8-9   Frequency: Initially few times a year. Since Sept 2024 - has been several times a day to a couple times a week, now down to up to twice per week in 2025   Etiology: Psychogenic non-epileptic spells   Co-morbidities: Crouzon Syndrome (w/ multiple cranial surgeries), ADHD, Bipolar and autism   4. Triggers: Bright flashing lights, stress, large crowds, loud sounds   "

## 2025-05-27 NOTE — DISCHARGE SUMMARY
Discharge Diagnosis  PNES     Epilepsy Quality and Core Measure Topics  The patient was encouraged to keep a seizure diary  The patient was encouraged to practice good sleep hygiene  The risks and benefits of pertinent medications were discussed with the patient and/or family  Addressed all relevant psychiatric comorbidities  First Time Seizures  No this is not the patient's first seizure  Is this patient seizure free?  No, the following changes will be made to reduce seizure frequency: - referral to psychiatry and psychology for PNES   Should this patient observe standard seizure precautions?  Yes due to loss of responsiveness with PNES spells. Reviewed seizure precautions with patient; specifically, the patient may not drive, may not operate heavy machinery, ought not swim unsupervised, should shower rather than bath, should be cautious with hot or heavy objects, and should not perform any activities at heights such as on a ladder. This will be re-assessed at the patient's next appointment.   Medication Side Effects Discussion Statement  NA  Women with Epilepsy Discussion  NA    Issues Requiring Follow-Up  [ ] PNES - follow up with neurology Radha Ponce. Referral to psychology (CBT) and psychiatry (Dr. Bustamante)     Test Results Pending At Discharge  Pending Labs       No current pending labs.            Hospital Course  Grady Guy is a 24 y.o. right-handed female w/ PMHx of Crouzon syndrome, bipolar disorder, autism, ADHD who presents to EMU for characterization of paroxysmal events. She was previously following with Dr. Juarez ([facility]) then referred to epilepsy clinic with  who referred to EMU for further evaluation of her paroxysmal spells. On 5/6/25, reported on telephone encounter that she had 2 back to back events described as violent shaking and foaming at the mouth without tongue biting and crying after the episode and was started on OXC 300mg BID in interm.     During this 24 hour EMU stay  "- she was slowly taken off her OXC. She had one of her typical spells that was captured with NO EEG correlate. This was described as unresponsiveness with bilateral hand \"flapping-like\" movements and pelvic/whole body thrusting. This lasted about 3-4 minutes and she was crying in interim for a long period of time. Per fiance in the room, this was consistent with her \"bigger\" spells. 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 We discussed this with patient and her fiance in detail. We also discussed psychology and psychiatry referrals for further management of PNES in conjunction with neurology. She was discharged home in a stable condition.     ASMs on discharge: NONE. Oxcarbazepine DISCONTINUED given strong suspicion for PNES.     4D-Classification:  Event type: PSYCHOGENIC non-epileptic spells    Seizure Semiology: Dyscognitive --> Bilateral Motor event   Onset: Age 8-9   Frequency: Initially few times a year. Since Sept 2024 - has been several times a day to a couple times a week, now down to up to twice per week in 2025   Etiology: Psychogenic non-epileptic spells   Co-morbidities: Crouzon Syndrome (w/ multiple cranial surgeries), ADHD, Bipolar and autism   4. Triggers: Bright flashing lights, stress, large crowds, loud sounds      Pertinent Physical Exam At Time of Discharge  Resting comfortably in bed     Home Medications     Medication List      CONTINUE taking these medications     * albuterol 90 mcg/actuation inhaler   * albuterol 2.5 mg /3 mL (0.083 %) nebulizer solution   ibuprofen 200 mg tablet   Symbicort 160-4.5 mcg/actuation inhaler; Generic drug:   budesonide-formoterol  * This list has 2 medication(s) that are the same as other medications   prescribed for you. Read the directions carefully, and ask your doctor or   other care provider to review them with you.     STOP taking these medications     OXcarbazepine 150 mg tablet; " Commonly known as: Trileptal       Outpatient Follow-Up  Future Appointments   Date Time Provider Department Center   5/28/2025 10:00 AM [facility] EEG ROOM 1 [facility]       Cristobal Sagastume MD

## 2025-05-27 NOTE — PROGRESS NOTES
Social Work Assessment:     05/27/25 1424   Discharge Planning   Living Arrangements Parent   Support Systems Parent   Assistance Needed NONE   Type of Residence Private residence   Who is requesting discharge planning? Provider   Home or Post Acute Services None   Expected Discharge Disposition Home   Financial Resource Strain   How hard is it for you to pay for the very basics like food, housing, medical care, and heating? Not very   Housing Stability   In the last 12 months, was there a time when you were not able to pay the mortgage or rent on time? N   In the past 12 months, how many times have you moved where you were living? 1   At any time in the past 12 months, were you homeless or living in a shelter (including now)? N   Transportation Needs   In the past 12 months, has lack of transportation kept you from medical appointments or from getting medications? no   In the past 12 months, has lack of transportation kept you from meetings, work, or from getting things needed for daily living? No     LISW spoke with patient on this date.  Patient lives at home with her mother.  Patient is completely independent at home.  Patient reported that he is able to afford medications.  Patient will return home with no needs.    Zuri Murray, MSW, YOSEPH-S

## 2025-05-27 NOTE — CARE PLAN
Problem: Pain - Adult  Goal: Verbalizes/displays adequate comfort level or baseline comfort level  Outcome: Progressing     Problem: Safety - Adult  Goal: Free from fall injury  Outcome: Progressing     Problem: Discharge Planning  Goal: Discharge to home or other facility with appropriate resources  Outcome: Progressing     Problem: Chronic Conditions and Co-morbidities  Goal: Patient's chronic conditions and co-morbidity symptoms are monitored and maintained or improved  Outcome: Progressing     Problem: Nutrition  Goal: Nutrient intake appropriate for maintaining nutritional needs  Outcome: Progressing     Problem: Fall/Injury  Goal: Not fall by end of shift  Outcome: Progressing  Goal: Be free from injury by end of the shift  Outcome: Progressing  Goal: Verbalize understanding of personal risk factors for fall in the hospital  Outcome: Progressing  Goal: Verbalize understanding of risk factor reduction measures to prevent injury from fall in the home  Outcome: Progressing  Goal: Use assistive devices by end of the shift  Outcome: Progressing  Goal: Pace activities to prevent fatigue by end of the shift  Outcome: Progressing     Problem: Seizures  Goal: Absence or minimized seizure activity  Outcome: Progressing  Goal: Freedom from injury  Outcome: Progressing  Goal: Intact skin surrounding leads  Outcome: Progressing  Goal: No signs of respiratory or cardiac compromise  Outcome: Progressing  Goal: Protection of airway  Outcome: Progressing   The patient's goals for the shift include      The clinical goals for the shift include Pt will tolerate vEEG monitoring to capture events for clinical correlation and medical management    Over the shift, the patient did not make progress toward the following goals. Barriers to progression include . Recommendations to address these barriers include .

## 2025-05-27 NOTE — DISCHARGE INSTRUCTIONS
Dear Grady Guy,    You were admitted for evaluating your seizure-like spells. We monitored you on video EEG and you had one of your typical spells. Your EEG did not show any abnormalities and looking at your episode - it is consistent with a non-epileptic spell likely psychogenic non-epileptic spells. You do not have epilepsy which is good news. We stopped your oxcarbazepine - you DO NOT need to be on seizure medications.     We strongly recommend seeing a psychologist and psychiatrist for your psychogenic non-epileptic spells. We have placed a referral for this and you should get a call to schedule these appointments. These have shown to help patients significantly and decrease the frequency of these episodes. Please follow up with your neurologist, Radha Ponce in a month.      Since you do lose responsiveness with these spells - please take note of the following precautions: Do not drive, not to use power tools or operate heavy machinery, or be on ladders.  You may continue to use the shower, but not the bath. Refrain from any activity which could result in injury to yourself or others if you lost consciousness. These restrictions should continue until instructed by a doctor to do otherwise.  These restrictions would be documented in the medical record.     It was a pleasure taking care of you!     Sincerely,    Department of Neurology

## 2025-06-02 LAB
ATRIAL RATE: 87 BPM
P AXIS: 41 DEGREES
P OFFSET: 207 MS
P ONSET: 156 MS
PR INTERVAL: 128 MS
Q ONSET: 220 MS
QRS COUNT: 14 BEATS
QRS DURATION: 88 MS
QT INTERVAL: 358 MS
QTC CALCULATION(BAZETT): 430 MS
QTC FREDERICIA: 405 MS
R AXIS: 77 DEGREES
T AXIS: 53 DEGREES
T OFFSET: 399 MS
VENTRICULAR RATE: 87 BPM